# Patient Record
Sex: FEMALE | Race: WHITE | NOT HISPANIC OR LATINO | Employment: FULL TIME | URBAN - METROPOLITAN AREA
[De-identification: names, ages, dates, MRNs, and addresses within clinical notes are randomized per-mention and may not be internally consistent; named-entity substitution may affect disease eponyms.]

---

## 2018-08-01 ENCOUNTER — OFFICE VISIT (OUTPATIENT)
Dept: FAMILY MEDICINE CLINIC | Facility: CLINIC | Age: 53
End: 2018-08-01
Payer: COMMERCIAL

## 2018-08-01 VITALS
DIASTOLIC BLOOD PRESSURE: 70 MMHG | HEIGHT: 63 IN | RESPIRATION RATE: 16 BRPM | HEART RATE: 66 BPM | WEIGHT: 124 LBS | TEMPERATURE: 97.8 F | SYSTOLIC BLOOD PRESSURE: 122 MMHG | BODY MASS INDEX: 21.97 KG/M2

## 2018-08-01 DIAGNOSIS — R53.83 FATIGUE, UNSPECIFIED TYPE: Primary | ICD-10-CM

## 2018-08-01 PROCEDURE — 99204 OFFICE O/P NEW MOD 45 MIN: CPT | Performed by: FAMILY MEDICINE

## 2018-08-01 PROCEDURE — 3008F BODY MASS INDEX DOCD: CPT | Performed by: FAMILY MEDICINE

## 2018-08-01 NOTE — PROGRESS NOTES
FAMILY PRACTICE OFFICE VISIT       NAME: Alva Wen  AGE: 47 y o  SEX: female       : 1965        MRN: [id number]    DATE: 2018  TIME: 1:16 PM    Assessment and Plan     Problem List Items Addressed This Visit     Fatigue - Primary    Relevant Orders    CBC    Comprehensive metabolic panel    TSH, 3rd generation    Lyme disease, western blot        No specific etiology determined for patient's fatigue  We will check blood work as ordered for further evaluation  We will hold on sleep study as patient's symptoms are not significant for sleep apnea  She was also given referral to gastroenterologist for screening colonoscopy  There are no Patient Instructions on file for this visit  Chief Complaint     Chief Complaint   Patient presents with    Fatigue     Patient has been very fatigue x 4-5 months  History of Present Illness     Patient describes chronic fatigue for the past 4-5 months  She denies any changes with her work schedule a which is usually 40+ hours per week  She sleeps from 11:30 pm to 7 a m  unfortunately she awakens and shortly after 1 hour she feels very fatigued once again  She does not wake up often throughout the night  She has lost a 70 pounds over the past 2 years with changes in her diet and exercise regimen  Patient walks often throughout the week and strives to get 10,000 steps a day  She denies any changes with her bowel movements  She does seeing her gynecologist on a regular bases and has had mammograms  She has yet to obtain screening colonoscopy as recommended by her gynecologist   She does not take any daily medications  She is a nonsmoker  She does snore at night but not report any gasping for air at night nor any witnessed apneic episodes        Review of Systems   Review of Systems   Constitutional: Positive for fatigue  Negative for fever  HENT: Negative  Respiratory: Negative  Cardiovascular: Negative  Gastrointestinal: Negative  Genitourinary: Negative  Musculoskeletal: Negative  Skin: Negative  Psychiatric/Behavioral: Negative  Active Problem List     Patient Active Problem List   Diagnosis    Fatigue       Past Medical History:  No past medical history on file  Past Surgical History:  No past surgical history on file  Family History:  No family history on file  Social History:  Social History     Social History    Marital status: /Civil Union     Spouse name: N/A    Number of children: N/A    Years of education: N/A     Occupational History    Not on file  Social History Main Topics    Smoking status: Never Smoker    Smokeless tobacco: Never Used    Alcohol use Yes    Drug use: No    Sexual activity: Not on file     Other Topics Concern    Not on file     Social History Narrative    No narrative on file       Objective     Vitals:    08/01/18 1114   BP: 122/70   Pulse: 66   Resp: 16   Temp: 97 8 °F (36 6 °C)     Wt Readings from Last 3 Encounters:   08/01/18 56 2 kg (124 lb)   10/22/15 72 6 kg (160 lb)   06/07/13 66 7 kg (147 lb)       Physical Exam   Constitutional: She is oriented to person, place, and time  No distress  HENT:   Mouth/Throat: Oropharynx is clear and moist  No oropharyngeal exudate  Tympanic membranes within normal limits bilaterally   Neck:   No carotid bruits   Cardiovascular:   Regular rate and rhythm with no murmurs   Pulmonary/Chest:   Lungs are clear to auscultation without wheezes,rales, or rhonchi   Abdominal:   Abdomen is soft, nontender with positive bowel sounds  There is no rebound or guarding  No masses palpated   Musculoskeletal: She exhibits no edema  Lymphadenopathy:     She has no cervical adenopathy  Neurological: She is alert and oriented to person, place, and time  No cranial nerve deficit  Skin: No rash noted  Psychiatric: She has a normal mood and affect   Her behavior is normal  Judgment and thought content normal        Pertinent Laboratory/Diagnostic Studies:  No results found for: GLUCOSE, BUN, CREATININE, CALCIUM, NA, K, CO2, CL  No results found for: ALT, AST, GGT, ALKPHOS, BILITOT    No results found for: WBC, HGB, HCT, MCV, PLT    No results found for: TSH    No results found for: CHOL  No results found for: TRIG  No results found for: HDL  No results found for: LDLCALC  No results found for: HGBA1C    No results found for this or any previous visit  Orders Placed This Encounter   Procedures    CBC    Comprehensive metabolic panel    TSH, 3rd generation    Lyme disease, western blot       ALLERGIES:  No Known Allergies    Current Medications     No current outpatient prescriptions on file  No current facility-administered medications for this visit  Health Maintenance   There are no preventive care reminders to display for this patient  There is no immunization history on file for this patient      Alexi Tapia MD

## 2018-08-02 ENCOUNTER — APPOINTMENT (OUTPATIENT)
Dept: LAB | Facility: CLINIC | Age: 53
End: 2018-08-02
Payer: COMMERCIAL

## 2018-08-02 DIAGNOSIS — R53.83 FATIGUE, UNSPECIFIED TYPE: ICD-10-CM

## 2018-08-02 LAB
ALBUMIN SERPL BCP-MCNC: 3.6 G/DL (ref 3.5–5)
ALP SERPL-CCNC: 61 U/L (ref 46–116)
ALT SERPL W P-5'-P-CCNC: 20 U/L (ref 12–78)
ANION GAP SERPL CALCULATED.3IONS-SCNC: 6 MMOL/L (ref 4–13)
AST SERPL W P-5'-P-CCNC: 19 U/L (ref 5–45)
BILIRUB SERPL-MCNC: 0.54 MG/DL (ref 0.2–1)
BUN SERPL-MCNC: 16 MG/DL (ref 5–25)
CALCIUM SERPL-MCNC: 8.9 MG/DL (ref 8.3–10.1)
CHLORIDE SERPL-SCNC: 104 MMOL/L (ref 100–108)
CO2 SERPL-SCNC: 31 MMOL/L (ref 21–32)
CREAT SERPL-MCNC: 0.71 MG/DL (ref 0.6–1.3)
ERYTHROCYTE [DISTWIDTH] IN BLOOD BY AUTOMATED COUNT: 12.6 % (ref 11.6–15.1)
GFR SERPL CREATININE-BSD FRML MDRD: 98 ML/MIN/1.73SQ M
GLUCOSE P FAST SERPL-MCNC: 85 MG/DL (ref 65–99)
HCT VFR BLD AUTO: 39.6 % (ref 34.8–46.1)
HGB BLD-MCNC: 13.3 G/DL (ref 11.5–15.4)
MCH RBC QN AUTO: 30.8 PG (ref 26.8–34.3)
MCHC RBC AUTO-ENTMCNC: 33.6 G/DL (ref 31.4–37.4)
MCV RBC AUTO: 92 FL (ref 82–98)
PLATELET # BLD AUTO: 227 THOUSANDS/UL (ref 149–390)
PMV BLD AUTO: 9.8 FL (ref 8.9–12.7)
POTASSIUM SERPL-SCNC: 4.2 MMOL/L (ref 3.5–5.3)
PROT SERPL-MCNC: 7.1 G/DL (ref 6.4–8.2)
RBC # BLD AUTO: 4.32 MILLION/UL (ref 3.81–5.12)
SODIUM SERPL-SCNC: 141 MMOL/L (ref 136–145)
TSH SERPL DL<=0.05 MIU/L-ACNC: 3.89 UIU/ML (ref 0.36–3.74)
WBC # BLD AUTO: 3.24 THOUSAND/UL (ref 4.31–10.16)

## 2018-08-02 PROCEDURE — 80053 COMPREHEN METABOLIC PANEL: CPT

## 2018-08-02 PROCEDURE — 85027 COMPLETE CBC AUTOMATED: CPT

## 2018-08-02 PROCEDURE — 84443 ASSAY THYROID STIM HORMONE: CPT

## 2018-08-02 PROCEDURE — 86617 LYME DISEASE ANTIBODY: CPT

## 2018-08-02 PROCEDURE — 36415 COLL VENOUS BLD VENIPUNCTURE: CPT

## 2018-08-03 ENCOUNTER — TELEPHONE (OUTPATIENT)
Dept: FAMILY MEDICINE CLINIC | Facility: CLINIC | Age: 53
End: 2018-08-03

## 2018-08-03 DIAGNOSIS — E03.9 HYPOTHYROIDISM, UNSPECIFIED TYPE: Primary | ICD-10-CM

## 2018-08-03 LAB
B BURGDOR IGG PATRN SER IB-IMP: NEGATIVE
B BURGDOR IGM PATRN SER IB-IMP: NEGATIVE
B BURGDOR18KD IGG SER QL IB: NORMAL
B BURGDOR23KD IGG SER QL IB: NORMAL
B BURGDOR23KD IGM SER QL IB: NORMAL
B BURGDOR28KD IGG SER QL IB: NORMAL
B BURGDOR30KD IGG SER QL IB: NORMAL
B BURGDOR39KD IGG SER QL IB: NORMAL
B BURGDOR39KD IGM SER QL IB: NORMAL
B BURGDOR41KD IGG SER QL IB: NORMAL
B BURGDOR41KD IGM SER QL IB: NORMAL
B BURGDOR45KD IGG SER QL IB: NORMAL
B BURGDOR58KD IGG SER QL IB: NORMAL
B BURGDOR66KD IGG SER QL IB: NORMAL
B BURGDOR93KD IGG SER QL IB: NORMAL

## 2018-08-03 RX ORDER — LEVOTHYROXINE SODIUM 0.03 MG/1
25 TABLET ORAL DAILY
Qty: 30 TABLET | Refills: 5 | Status: SHIPPED | OUTPATIENT
Start: 2018-08-03

## 2018-08-03 NOTE — TELEPHONE ENCOUNTER
----- Message from Trish Mcginnis MD sent at 3/8/3398  2:43 PM EDT -----  Patient blood work did show borderline low thyroid level  This may cause some of her fatigue  I recommend starting levothyroxine 25 mcg once daily and recheck blood tests in 3 months    I will print out prescription and send medication to pharmacy

## 2018-08-06 ENCOUNTER — TELEPHONE (OUTPATIENT)
Dept: FAMILY MEDICINE CLINIC | Facility: CLINIC | Age: 53
End: 2018-08-06

## 2018-08-06 DIAGNOSIS — E03.9 HYPOTHYROIDISM, UNSPECIFIED TYPE: Primary | ICD-10-CM

## 2018-08-06 DIAGNOSIS — D72.819 LEUKOPENIA, UNSPECIFIED TYPE: ICD-10-CM

## 2018-08-06 NOTE — TELEPHONE ENCOUNTER
----- Message from [address]  [address] sent at 8/3/2018  6:21 PM EDT -----  Regarding: Test Results Question  Contact: [phone]  I have a question about Comprehensive Metabolic Panel resulted on 8/2/18, 5:44 PM       Can you tell me which test reflected the elevated thyroid levels? I'm not sure which one tests for that

## 2018-08-06 NOTE — TELEPHONE ENCOUNTER
----- Message from [address] Wen sent at 8/4/2018  7:32 AM EDT -----  Regarding: Test Results Question  Contact: [phone]  You can disregard my previous question  The TSH result wasn't in my chart yet  New question though, should we be concerned about the low WBC reading? Thank you

## 2018-08-06 NOTE — TELEPHONE ENCOUNTER
Her wbc below normal may be related to her hypothyroid issue but usually not anything dangerous  I would just recheck when she rechecks her thyroid bw  I will print out new script

## 2019-10-17 DIAGNOSIS — Z12.39 SCREENING FOR MALIGNANT NEOPLASM OF BREAST: Primary | ICD-10-CM

## 2021-04-08 DIAGNOSIS — Z23 ENCOUNTER FOR IMMUNIZATION: ICD-10-CM

## 2023-01-31 ENCOUNTER — OFFICE VISIT (OUTPATIENT)
Dept: FAMILY MEDICINE CLINIC | Facility: CLINIC | Age: 58
End: 2023-01-31

## 2023-01-31 VITALS
HEART RATE: 75 BPM | RESPIRATION RATE: 18 BRPM | TEMPERATURE: 98 F | SYSTOLIC BLOOD PRESSURE: 120 MMHG | OXYGEN SATURATION: 97 % | HEIGHT: 61 IN | BODY MASS INDEX: 33.42 KG/M2 | WEIGHT: 177 LBS | DIASTOLIC BLOOD PRESSURE: 80 MMHG

## 2023-01-31 DIAGNOSIS — Z01.818 PREOPERATIVE EVALUATION OF A MEDICAL CONDITION TO RULE OUT SURGICAL CONTRAINDICATIONS (TAR REQUIRED): Primary | ICD-10-CM

## 2023-01-31 RX ORDER — LIDOCAINE AND PRILOCAINE 25; 25 MG/G; MG/G
CREAM TOPICAL
COMMUNITY
Start: 2023-01-17

## 2023-01-31 NOTE — ASSESSMENT & PLAN NOTE
Preoperative consultation  Overall the patient appears to be in stable health  I reviewed her preadmission testing of blood test, EKG, and chest x-ray    She is medically cleared to have bilateral mastectomy surgery as described

## 2023-01-31 NOTE — PROGRESS NOTES
FAMILY PRACTICE OFFICE VISIT       NAME: Alva Wen  AGE: 65 y o  SEX: female       : 1965        MRN: [id number]    DATE: 2023  TIME: 9:01 AM    Assessment and Plan     Problem List Items Addressed This Visit        Other    Preoperative evaluation of a medical condition to rule out surgical contraindications (TAR required) - Primary     Preoperative consultation  Overall the patient appears to be in stable health  I reviewed her preadmission testing of blood test, EKG, and chest x-ray  She is medically cleared to have bilateral mastectomy surgery as described                Chief Complaint     Chief Complaint   Patient presents with   • Pre-op Exam     B/L Masectomy with dr Emiliano Jimenez  on 2023       History of Present Illness     Patient in the office for preoperative consultation  She is scheduled to have bilateral mastectomy surgery performed on 2023  Procedure will be performed at [facility] by Hildegarde Essex  Patient denies any recent illness  She is up-to-date with COVID vaccination  She does not take any medications on a regular basis  Patient was noted to have abnormal screening mammogram which required breast biopsy  Patient biopsy was positive for carcinoma  Extent of disease is unknown at this time and patient will have lymph node biopsy at time of mastectomy surgery to decide if she requires any further treatment  Patient had prior preadmission testing of blood work, EKG, and chest x-ray which I reviewed  Review of Systems   Review of Systems   Constitutional: Negative  HENT: Negative  Eyes: Negative  Respiratory: Negative  Cardiovascular: Negative  Gastrointestinal: Negative  Genitourinary: Negative  Musculoskeletal: Negative  Skin: Negative  Neurological: Negative  Psychiatric/Behavioral: Negative          Active Problem List     Patient Active Problem List   Diagnosis   • Fatigue   • Preoperative evaluation of a medical condition to rule out surgical contraindications (TAR required)       Past Medical History:  Past Medical History:   Diagnosis Date   • Medical history unknown        Past Surgical History:  Past Surgical History:   Procedure Laterality Date   •  SECTION      had c section        Family History:  Family History   Problem Relation Age of Onset   • Heart disease Maternal Grandfather    • Cancer Paternal Grandmother    • Cancer Paternal Grandfather        Social History:  Social History     Socioeconomic History   • Marital status: /Civil Union     Spouse name: Not on file   • Number of children: Not on file   • Years of education: Not on file   • Highest education level: Not on file   Occupational History   • Not on file   Tobacco Use   • Smoking status: Never   • Smokeless tobacco: Never   Vaping Use   • Vaping Use: Never used   Substance and Sexual Activity   • Alcohol use: Yes     Comment: Pt drinks alcohol- beer on occasion   • Drug use: No   • Sexual activity: Yes   Other Topics Concern   • Not on file   Social History Narrative    Exercise Regularly      Social Determinants of Health     Financial Resource Strain: Not on file   Food Insecurity: Not on file   Transportation Needs: Not on file   Physical Activity: Not on file   Stress: Not on file   Social Connections: Not on file   Intimate Partner Violence: Not on file   Housing Stability: Not on file       Objective     Vitals:    23 0824   BP: 120/80   Pulse: 75   Resp: 18   Temp: 98 °F (36 7 °C)   SpO2: 97%     Wt Readings from Last 3 Encounters:   23 80 3 kg (177 lb)   18 56 2 kg (124 lb)   10/22/15 72 6 kg (160 lb)       Physical Exam  Constitutional:       General: She is not in acute distress  Appearance: Normal appearance  She is not ill-appearing  HENT:      Head: Normocephalic and atraumatic  Eyes:      General:         Right eye: No discharge  Left eye: No discharge        Extraocular Movements: Extraocular movements intact  Conjunctiva/sclera: Conjunctivae normal       Pupils: Pupils are equal, round, and reactive to light  Neck:      Vascular: No carotid bruit  Cardiovascular:      Rate and Rhythm: Normal rate and regular rhythm  Heart sounds: Normal heart sounds  No murmur heard  Pulmonary:      Effort: Pulmonary effort is normal       Breath sounds: Normal breath sounds  No wheezing, rhonchi or rales  Abdominal:      General: Abdomen is flat  Bowel sounds are normal  There is no distension  Palpations: Abdomen is soft  Tenderness: There is no abdominal tenderness  There is no guarding or rebound  Musculoskeletal:      Right lower leg: No edema  Left lower leg: No edema  Lymphadenopathy:      Cervical: No cervical adenopathy  Skin:     Findings: No rash  Neurological:      General: No focal deficit present  Mental Status: She is alert and oriented to person, place, and time  Cranial Nerves: No cranial nerve deficit  Psychiatric:         Mood and Affect: Mood normal          Behavior: Behavior normal          Thought Content:  Thought content normal          Judgment: Judgment normal          Pertinent Laboratory/Diagnostic Studies:  Lab Results   Component Value Date    BUN 16 08/02/2018    CREATININE 0 71 08/02/2018    CALCIUM 8 9 08/02/2018    K 4 2 08/02/2018    CO2 31 08/02/2018     08/02/2018     Lab Results   Component Value Date    ALT 20 08/02/2018    AST 19 08/02/2018    ALKPHOS 61 08/02/2018       Lab Results   Component Value Date    WBC 3 24 (L) 08/02/2018    HGB 13 3 08/02/2018    HCT 39 6 08/02/2018    MCV 92 08/02/2018     08/02/2018       No results found for: TSH    No results found for: CHOL  No results found for: TRIG  No results found for: HDL  No results found for: LDLCALC  No results found for: HGBA1C    Results for orders placed or performed in visit on 08/02/18   CBC   Result Value Ref Range    WBC 3 24 (L) 4 31 - 10 16 Thousand/uL    RBC 4 32 3 81 - 5 12 Million/uL    Hemoglobin 13 3 11 5 - 15 4 g/dL    Hematocrit 39 6 34 8 - 46 1 %    MCV 92 82 - 98 fL    MCH 30 8 26 8 - 34 3 pg    MCHC 33 6 31 4 - 37 4 g/dL    RDW 12 6 11 6 - 15 1 %    Platelets 829 274 - 067 Thousands/uL    MPV 9 8 8 9 - 12 7 fL   Comprehensive metabolic panel   Result Value Ref Range    Sodium 141 136 - 145 mmol/L    Potassium 4 2 3 5 - 5 3 mmol/L    Chloride 104 100 - 108 mmol/L    CO2 31 21 - 32 mmol/L    ANION GAP 6 4 - 13 mmol/L    BUN 16 5 - 25 mg/dL    Creatinine 0 71 0 60 - 1 30 mg/dL    Glucose, Fasting 85 65 - 99 mg/dL    Calcium 8 9 8 3 - 10 1 mg/dL    AST 19 5 - 45 U/L    ALT 20 12 - 78 U/L    Alkaline Phosphatase 61 46 - 116 U/L    Total Protein 7 1 6 4 - 8 2 g/dL    Albumin 3 6 3 5 - 5 0 g/dL    Total Bilirubin 0 54 0 20 - 1 00 mg/dL    eGFR 98 ml/min/1 73sq m   TSH, 3rd generation   Result Value Ref Range    TSH 3RD GENERATON 3 890 (H) 0 358 - 3 740 uIU/mL   Lyme disease, western blot   Result Value Ref Range    Lyme 18 kD IgG Absent     Lyme 23 kD IgG Absent     Lyme 28 kD IgG Absent     Lyme 30 kD IgG Absent     Lyme 39 kD IgG Absent     Lyme 41 kD IgG Absent     Lyme 45 kD IgG Absent     Lyme 58 kD IgG Absent     Lyme 66 kD IgG Absent     Lyme 93 kD IgG Absent     Lyme 23 kD IgM Absent     Lyme 39 kD IgM Absent     Lyme 41 kD IgM Absent     Lyme IgG WB Interp  Negative     Lyme IgM WB Interp  Negative        No orders of the defined types were placed in this encounter  ALLERGIES:  No Known Allergies    Current Medications     Current Outpatient Medications   Medication Sig Dispense Refill   • lidocaine-prilocaine (EMLA) cream PLEASE SEE ATTACHED FOR DETAILED DIRECTIONS       No current facility-administered medications for this visit           Health Maintenance     Health Maintenance   Topic Date Due   • Hepatitis C Screening  Never done   • HIV Screening  Never done   • BMI: Followup Plan  Never done   • Annual Physical Never done   • DTaP,Tdap,and Td Vaccines (1 - Tdap) Never done   • Cervical Cancer Screening  Never done   • Breast Cancer Screening: Mammogram  Never done   • Colorectal Cancer Screening  Never done   • COVID-19 Vaccine (3 - Booster for Moderna series) 07/03/2021   • Influenza Vaccine (1) 06/30/2023 (Originally 9/1/2022)   • Depression Screening  01/31/2024   • BMI: Adult  01/31/2024   • Pneumococcal Vaccine: Pediatrics (0 to 5 Years) and At-Risk Patients (6 to 59 Years)  Aged Out   • HIB Vaccine  Aged Out   • IPV Vaccine  Aged Out   • Hepatitis A Vaccine  Aged Out   • Meningococcal ACWY Vaccine  Aged Out   • HPV Vaccine  Aged [employer] History   Administered Date(s) Administered   • COVID-19 MODERNA VACC 0 5 ML IM 04/10/2021, 71/99/9273       Lelo Cates MD

## 2023-06-01 ENCOUNTER — APPOINTMENT (RX ONLY)
Dept: URBAN - METROPOLITAN AREA CLINIC 333 | Facility: CLINIC | Age: 58
Setting detail: DERMATOLOGY
End: 2023-06-01

## 2023-06-01 DIAGNOSIS — L81.4 OTHER MELANIN HYPERPIGMENTATION: ICD-10-CM

## 2023-06-01 DIAGNOSIS — L40.0 PSORIASIS VULGARIS: ICD-10-CM | Status: INADEQUATELY CONTROLLED

## 2023-06-01 DIAGNOSIS — L57.8 OTHER SKIN CHANGES DUE TO CHRONIC EXPOSURE TO NONIONIZING RADIATION: ICD-10-CM

## 2023-06-01 DIAGNOSIS — L82.1 OTHER SEBORRHEIC KERATOSIS: ICD-10-CM

## 2023-06-01 DIAGNOSIS — D22 MELANOCYTIC NEVI: ICD-10-CM

## 2023-06-01 DIAGNOSIS — Z12.83 ENCOUNTER FOR SCREENING FOR MALIGNANT NEOPLASM OF SKIN: ICD-10-CM

## 2023-06-01 PROBLEM — D23.72 OTHER BENIGN NEOPLASM OF SKIN OF LEFT LOWER LIMB, INCLUDING HIP: Status: ACTIVE | Noted: 2023-06-01

## 2023-06-01 PROBLEM — D22.5 MELANOCYTIC NEVI OF TRUNK: Status: ACTIVE | Noted: 2023-06-01

## 2023-06-01 PROCEDURE — ? FULL BODY SKIN EXAM

## 2023-06-01 PROCEDURE — 99204 OFFICE O/P NEW MOD 45 MIN: CPT

## 2023-06-01 PROCEDURE — ? COUNSELING

## 2023-06-01 PROCEDURE — ? TREATMENT REGIMEN

## 2023-06-01 PROCEDURE — ? PRESCRIPTION MEDICATION MANAGEMENT

## 2023-06-01 PROCEDURE — ? PRESCRIPTION

## 2023-06-01 RX ORDER — CLOBETASOL PROPIONATE 0.5 MG/ML
1 SOLUTION TOPICAL QD
Qty: 50 | Refills: 3 | Status: ERX | COMMUNITY
Start: 2023-06-01

## 2023-06-01 RX ADMIN — CLOBETASOL PROPIONATE 1: 0.5 SOLUTION TOPICAL at 00:00

## 2023-06-01 ASSESSMENT — LOCATION DETAILED DESCRIPTION DERM
LOCATION DETAILED: RIGHT SUPERIOR MEDIAL UPPER BACK
LOCATION DETAILED: INFERIOR THORACIC SPINE
LOCATION DETAILED: LEFT SUPERIOR UPPER BACK
LOCATION DETAILED: RIGHT SUPERIOR UPPER BACK
LOCATION DETAILED: UPPER STERNUM
LOCATION DETAILED: RIGHT SUPERIOR PARIETAL SCALP

## 2023-06-01 ASSESSMENT — LOCATION ZONE DERM
LOCATION ZONE: SCALP
LOCATION ZONE: TRUNK

## 2023-06-01 ASSESSMENT — LOCATION SIMPLE DESCRIPTION DERM
LOCATION SIMPLE: SCALP
LOCATION SIMPLE: RIGHT UPPER BACK
LOCATION SIMPLE: CHEST
LOCATION SIMPLE: UPPER BACK
LOCATION SIMPLE: LEFT UPPER BACK

## 2025-05-28 ENCOUNTER — OFFICE VISIT (OUTPATIENT)
Dept: FAMILY MEDICINE CLINIC | Facility: CLINIC | Age: 60
End: 2025-05-28
Payer: COMMERCIAL

## 2025-05-28 ENCOUNTER — APPOINTMENT (OUTPATIENT)
Dept: LAB | Facility: CLINIC | Age: 60
End: 2025-05-28
Payer: COMMERCIAL

## 2025-05-28 VITALS
OXYGEN SATURATION: 98 % | SYSTOLIC BLOOD PRESSURE: 138 MMHG | HEART RATE: 75 BPM | BODY MASS INDEX: 35.08 KG/M2 | HEIGHT: 61 IN | WEIGHT: 185.8 LBS | RESPIRATION RATE: 16 BRPM | DIASTOLIC BLOOD PRESSURE: 88 MMHG | TEMPERATURE: 98.2 F

## 2025-05-28 DIAGNOSIS — C50.919: ICD-10-CM

## 2025-05-28 DIAGNOSIS — R53.83 FATIGUE, UNSPECIFIED TYPE: Primary | ICD-10-CM

## 2025-05-28 DIAGNOSIS — Z23 ENCOUNTER FOR IMMUNIZATION: ICD-10-CM

## 2025-05-28 DIAGNOSIS — R53.83 FATIGUE, UNSPECIFIED TYPE: ICD-10-CM

## 2025-05-28 DIAGNOSIS — Z00.00 ANNUAL PHYSICAL EXAM: ICD-10-CM

## 2025-05-28 LAB
25(OH)D3 SERPL-MCNC: 63 NG/ML (ref 30–100)
ALBUMIN SERPL BCG-MCNC: 4.2 G/DL (ref 3.5–5)
ALP SERPL-CCNC: 96 U/L (ref 34–104)
ALT SERPL W P-5'-P-CCNC: 28 U/L (ref 7–52)
ANION GAP SERPL CALCULATED.3IONS-SCNC: 6 MMOL/L (ref 4–13)
AST SERPL W P-5'-P-CCNC: 31 U/L (ref 13–39)
BILIRUB SERPL-MCNC: 0.58 MG/DL (ref 0.2–1)
BUN SERPL-MCNC: 12 MG/DL (ref 5–25)
CALCIUM SERPL-MCNC: 9.3 MG/DL (ref 8.4–10.2)
CHLORIDE SERPL-SCNC: 103 MMOL/L (ref 96–108)
CHOLEST SERPL-MCNC: 221 MG/DL (ref ?–200)
CO2 SERPL-SCNC: 32 MMOL/L (ref 21–32)
CREAT SERPL-MCNC: 0.72 MG/DL (ref 0.6–1.3)
ERYTHROCYTE [DISTWIDTH] IN BLOOD BY AUTOMATED COUNT: 12.9 % (ref 11.6–15.1)
EST. AVERAGE GLUCOSE BLD GHB EST-MCNC: 123 MG/DL
FERRITIN SERPL-MCNC: 87 NG/ML (ref 30–307)
GFR SERPL CREATININE-BSD FRML MDRD: 92 ML/MIN/1.73SQ M
GLUCOSE P FAST SERPL-MCNC: 99 MG/DL (ref 65–99)
HBA1C MFR BLD: 5.9 %
HCT VFR BLD AUTO: 39.7 % (ref 34.8–46.1)
HDLC SERPL-MCNC: 46 MG/DL
HGB BLD-MCNC: 13.4 G/DL (ref 11.5–15.4)
IRON SATN MFR SERPL: 26 % (ref 15–50)
IRON SERPL-MCNC: 97 UG/DL (ref 50–212)
LDLC SERPL CALC-MCNC: 139 MG/DL (ref 0–100)
MCH RBC QN AUTO: 30.5 PG (ref 26.8–34.3)
MCHC RBC AUTO-ENTMCNC: 33.8 G/DL (ref 31.4–37.4)
MCV RBC AUTO: 90 FL (ref 82–98)
NONHDLC SERPL-MCNC: 175 MG/DL
PLATELET # BLD AUTO: 248 THOUSANDS/UL (ref 149–390)
PMV BLD AUTO: 9.7 FL (ref 8.9–12.7)
POTASSIUM SERPL-SCNC: 4.4 MMOL/L (ref 3.5–5.3)
PROT SERPL-MCNC: 7.2 G/DL (ref 6.4–8.4)
RBC # BLD AUTO: 4.4 MILLION/UL (ref 3.81–5.12)
SODIUM SERPL-SCNC: 141 MMOL/L (ref 135–147)
TIBC SERPL-MCNC: 373.8 UG/DL (ref 250–450)
TRANSFERRIN SERPL-MCNC: 267 MG/DL (ref 203–362)
TRIGL SERPL-MCNC: 180 MG/DL (ref ?–150)
TSH SERPL DL<=0.05 MIU/L-ACNC: 1.43 UIU/ML (ref 0.45–4.5)
UIBC SERPL-MCNC: 277 UG/DL (ref 155–355)
VIT B12 SERPL-MCNC: 260 PG/ML (ref 180–914)
WBC # BLD AUTO: 4.56 THOUSAND/UL (ref 4.31–10.16)

## 2025-05-28 PROCEDURE — 80053 COMPREHEN METABOLIC PANEL: CPT

## 2025-05-28 PROCEDURE — 83550 IRON BINDING TEST: CPT

## 2025-05-28 PROCEDURE — 82607 VITAMIN B-12: CPT

## 2025-05-28 PROCEDURE — 82306 VITAMIN D 25 HYDROXY: CPT

## 2025-05-28 PROCEDURE — 99396 PREV VISIT EST AGE 40-64: CPT | Performed by: FAMILY MEDICINE

## 2025-05-28 PROCEDURE — 84443 ASSAY THYROID STIM HORMONE: CPT

## 2025-05-28 PROCEDURE — 87389 HIV-1 AG W/HIV-1&-2 AB AG IA: CPT

## 2025-05-28 PROCEDURE — 83036 HEMOGLOBIN GLYCOSYLATED A1C: CPT

## 2025-05-28 PROCEDURE — 83540 ASSAY OF IRON: CPT

## 2025-05-28 PROCEDURE — 80061 LIPID PANEL: CPT

## 2025-05-28 PROCEDURE — 85027 COMPLETE CBC AUTOMATED: CPT

## 2025-05-28 PROCEDURE — 86803 HEPATITIS C AB TEST: CPT

## 2025-05-28 PROCEDURE — 36415 COLL VENOUS BLD VENIPUNCTURE: CPT

## 2025-05-28 PROCEDURE — 82728 ASSAY OF FERRITIN: CPT

## 2025-05-28 RX ORDER — ANASTROZOLE 1 MG/1
1 TABLET ORAL DAILY
COMMUNITY
Start: 2025-03-22

## 2025-05-28 RX ORDER — KETOCONAZOLE 20 MG/ML
SHAMPOO, SUSPENSION TOPICAL
COMMUNITY
Start: 2025-04-18

## 2025-05-28 RX ORDER — ROFLUMILAST 3 MG/G
AEROSOL, FOAM TOPICAL
COMMUNITY
Start: 2025-04-16

## 2025-05-28 NOTE — ASSESSMENT & PLAN NOTE
Diagnosed November 2022, mastectomy 2023. Follows with oncology in [place], will request records.

## 2025-05-28 NOTE — ASSESSMENT & PLAN NOTE
Orders:  •  CBC and Platelet; Future  •  TSH, 3rd generation with Free T4 reflex; Future  •  Vitamin D 25 hydroxy; Future  •  Vitamin B12; Future  •  Iron Panel (Includes Ferritin, Iron Sat%, Iron, and TIBC); Future

## 2025-05-28 NOTE — PROGRESS NOTES
Adult Annual Physical  Name: Alva Wen      : 1965      MRN: [id number]  Encounter Provider: Michelle Matthew DO  Encounter Date: 2025   Encounter department: [facility]    :  Assessment & Plan  Fatigue, unspecified type    Orders:  •  CBC and Platelet; Future  •  TSH, 3rd generation with Free T4 reflex; Future  •  Vitamin D 25 hydroxy; Future  •  Vitamin B12; Future  •  Iron Panel (Includes Ferritin, Iron Sat%, Iron, and TIBC); Future    Annual physical exam    Orders:  •  CBC and Platelet; Future  •  Comprehensive metabolic panel; Future  •  Lipid panel; Future  •  Hemoglobin A1C; Future  •  HIV 1/2 AB/AG w Reflex [facility] for 2 yr old and above; Future  •  Hepatitis C antibody; Future    Malignant neoplasm of breast greater than or equal to 2 cm in greatest dimension, unspecified laterality (HCC)  Diagnosed 2022, mastectomy . Follows with oncology in [place], will request records.       Encounter for immunization             Preventive Screenings:    - Breast cancer screening: has history of breast cancer   - Colon cancer screening: screening up-to-date   - Lung cancer screening: screening not indicated     Immunizations:  - Immunizations due: Tdap and Zoster (Shingrix)         History of Present Illness       A lot of fatigue, unsure if menopause or medication related (Anastrazole). Works long hours.     Mammograms done at [place], 2024 was last mammo, normal results.    DXA with oncologist, last was this year.    Last pap also in [place] last year.       Adult Annual Physical:  Patient presents for annual physical.     Diet and Physical Activity:  - Diet/Nutrition: no special diet and frequent junk food.  - Exercise: no formal exercise. tries to walk but no energy    Depression Screening:  - PHQ-2 Score: 0    General Health:  - Sleep: 7-8 hours of sleep on average and sleeps well.  - Hearing: normal hearing bilateral ears.  - Vision: wears  "glasses.  - Dental: regular dental visits and brushes teeth twice daily.    /GYN Health:  - Follows with GYN: yes.   - Menopause: postmenopausal.   - History of STDs: no  - Contraception: tubal ligation.      Advanced Care Planning:  - Has an advanced directive?: yes    - Has a durable medical POA?: no      Review of Systems      Objective   /88 (BP Location: Right arm, Patient Position: Sitting, Cuff Size: Standard)   Pulse 75   Temp 98.2 °F (36.8 °C) (Temporal)   Resp 16   Ht 5' 1.42\" (1.56 m)   Wt 84.3 kg (185 lb 12.8 oz)   SpO2 98%   BMI 34.63 kg/m²     Physical Exam  Vitals reviewed.   Constitutional:       Appearance: Normal appearance.   HENT:      Right Ear: External ear normal.      Left Ear: External ear normal.      Nose: Nose normal.      Mouth/Throat:      Mouth: Mucous membranes are moist.      Pharynx: Oropharynx is clear.     Eyes:      Extraocular Movements: Extraocular movements intact.      Conjunctiva/sclera: Conjunctivae normal.       Cardiovascular:      Rate and Rhythm: Normal rate and regular rhythm.      Heart sounds: Normal heart sounds.   Pulmonary:      Effort: Pulmonary effort is normal.      Breath sounds: Normal breath sounds.   Abdominal:      General: Abdomen is flat.     Skin:     General: Skin is warm and dry.      Capillary Refill: Capillary refill takes less than 2 seconds.     Neurological:      General: No focal deficit present.      Mental Status: She is alert.     Psychiatric:         Mood and Affect: Mood normal.         Behavior: Behavior normal.         "

## 2025-05-29 LAB
HCV AB SER QL: NORMAL
HIV 1+2 AB+HIV1 P24 AG SERPL QL IA: NORMAL

## 2025-06-02 ENCOUNTER — RESULTS FOLLOW-UP (OUTPATIENT)
Dept: FAMILY MEDICINE CLINIC | Facility: CLINIC | Age: 60
End: 2025-06-02

## 2025-06-02 ENCOUNTER — APPOINTMENT (OUTPATIENT)
Dept: RADIOLOGY | Facility: AMBULARY SURGERY CENTER | Age: 60
End: 2025-06-02
Attending: ORTHOPAEDIC SURGERY
Payer: COMMERCIAL

## 2025-06-02 ENCOUNTER — OFFICE VISIT (OUTPATIENT)
Dept: OBGYN CLINIC | Facility: CLINIC | Age: 60
End: 2025-06-02
Payer: COMMERCIAL

## 2025-06-02 VITALS — BODY MASS INDEX: 34.04 KG/M2 | HEIGHT: 62 IN | WEIGHT: 185 LBS

## 2025-06-02 DIAGNOSIS — S86.111A GASTROCNEMIUS STRAIN, RIGHT, INITIAL ENCOUNTER: Primary | ICD-10-CM

## 2025-06-02 DIAGNOSIS — M25.561 RIGHT KNEE PAIN, UNSPECIFIED CHRONICITY: ICD-10-CM

## 2025-06-02 DIAGNOSIS — G89.29 CHRONIC PAIN OF RIGHT KNEE: ICD-10-CM

## 2025-06-02 DIAGNOSIS — M25.561 CHRONIC PAIN OF RIGHT KNEE: ICD-10-CM

## 2025-06-02 PROCEDURE — 99204 OFFICE O/P NEW MOD 45 MIN: CPT | Performed by: ORTHOPAEDIC SURGERY

## 2025-06-02 PROCEDURE — 73562 X-RAY EXAM OF KNEE 3: CPT

## 2025-06-02 NOTE — PROGRESS NOTES
Name: Alva Wen      : 1965      MRN: [id number]  Encounter Provider: Graeme Arriola DO  Encounter Date: 2025   Encounter department: [facility]  Assessment & Plan  Gastrocnemius strain, right, initial encounter  Chronic pain of right knee  Physical exam, diagnostic imaging, and subjective history are all most consistent with the above diagnosis. The pathoanatomy and natural history of this diagnosis was explained to the patient in detail.   X-ray obtained today and reviewed with the patient demonstrating mild osteoarthritis, no acute injury  Discussed that posterior leg numbness is likely secondary to inflammation compressing a nerve in the posterior knee   Order placed today for patient to initiate outpatient Physical Therapy  Continue Physical Therapy until discharged from their care, transition to home exercise program  Apply Voltaren gel to painful area up to 4 times a day  May use ice or heat as needed for pain relief  May take NSAIDs/Tylenol as needed for pain control  Follow up on an as needed basis      Orders:    XR knee 3 vw right non injury; Future    Ambulatory referral to Physical Therapy; Future          Subjective:  Alva Wen is a 59 y.o. female who presents today for evaluation and treatment of posterior and medial based knee pain that began at least one month ago while carrying a ladder with her . In the hours after this task, she noticed moderate pain aggravated by prolonged sitting, increased activity. Her pain is intermittent, aching, and she usually has accompanying numbness in the posterior knee that does not radiate distally. She denies previous knee pain or injury. She began taking Aleve yesterday with relief. She denies mechanical symptoms.       The following portions of the patient's history were reviewed and updated as appropriate: allergies, current medications, past family history, past social history, past surgical  history and problem list.      Social History     Socioeconomic History    Marital status: /Civil Union     Spouse name: Not on file    Number of children: Not on file    Years of education: Not on file    Highest education level: Not on file   Occupational History    Not on file   Tobacco Use    Smoking status: Never    Smokeless tobacco: Never   Vaping Use    Vaping status: Never Used   Substance and Sexual Activity    Alcohol use: Yes     Comment: Pt drinks alcohol- beer on occasion    Drug use: No    Sexual activity: Yes   Other Topics Concern    Not on file   Social History Narrative    Exercise Regularly      Social Drivers of Health     Financial Resource Strain: Not on file   Food Insecurity: No Food Insecurity (5/26/2025)    Nursing - Inadequate Food Risk Classification     Worried About Running Out of Food in the Last Year: Never true     Ran Out of Food in the Last Year: Never true     Ran Out of Food in the Last Year: Not on file   Transportation Needs: No Transportation Needs (5/26/2025)    PRAPARE - Transportation     Lack of Transportation (Medical): No     Lack of Transportation (Non-Medical): No   Physical Activity: Not on file   Stress: Not on file   Social Connections: Not on file   Intimate Partner Violence: Not on file   Housing Stability: Low Risk  (5/26/2025)    Housing Stability Vital Sign     Unable to Pay for Housing in the Last Year: No     Number of Times Moved in the Last Year: 0     Homeless in the Last Year: No     Past Medical History[1]  Past Surgical History[2]  Allergies[3]  Medications Ordered Prior to Encounter[4]         Objective:    Review of Systems  Pertinent items are noted in HPI.  All other systems were reviewed and are negative.    Physical Exam  Cons: Appears well.  No apparent distress.  Psych: Alert. Oriented x3.  Mood and affect normal.  Eyes: PERRLA, EOMI  Resp: Normal effort.  No audible wheezing or stridor.  CV: Palpable pulse.  No discernable arrhythmia.   "No LE edema.  Lymph:  No palpable cervical, axillary, or inguinal lymphadenopathy.  Skin: Warm.  No palpable masses.  No visible lesions.  Neuro: Normal muscle tone.  Normal and symmetric DTR's.    BMI:   Estimated body mass index is 34.39 kg/m² as calculated from the following:    Height as of this encounter: 5' 1.5\" (1.562 m).    Weight as of this encounter: 83.9 kg (185 lb).  Lab Results   Component Value Date    HGBA1C 5.9 (H) 05/28/2025         Right Knee Exam     Muscle Strength   The patient has normal right knee strength.    Range of Motion   Extension:  0   Flexion:  120     Tests   Dean:  Medial - negative Lateral - negative  Varus: negative Valgus: negative    Other   Erythema: absent  Scars: absent  Sensation: normal  Pulse: present  Swelling: none  Effusion: no effusion present    Comments:    Pain with knee extension + ankle dorsiflexion  TTP medial head of gastroc, lateral head of gastroc vs plantaris, medial joint line  - Dean, - Thessaly  Sensation intact in DP/SP/Villegas/Sa/T nerve distributions  2+ DP & PT pulses  Brisk capillary refill in all toes                Procedures  Procedures  -None performed today    Diagnostics, reviewed and taken today if performed as documented:  I have personally reviewed pertinent films in PACS and my interpretation is x-ray of the right knee obtained today reviewed demonstrating: no acute osseous abnormalities, mild medial compartment osteoarthritis .        Scribe Attestation      I,:  Josseline Kumar am acting as a scribe while in the presence of the attending physician.:       I,:  Graeme Arriola, DO personally performed the services described in this documentation    as scribed in my presence.:             Portions of the record may have been created with voice recognition software.  Occasional wrong word or \"sound a like\" substitutions may have occurred due to the inherent limitations of voice recognition software.  Read the chart carefully and recognize, " using context, where substitutions have occurred.         [1]   Past Medical History:  Diagnosis Date    Medical history unknown    [2]   Past Surgical History:  Procedure Laterality Date     SECTION      had c section    [3] No Known Allergies  [4]   Current Outpatient Medications on File Prior to Visit   Medication Sig Dispense Refill    anastrozole (ARIMIDEX) 1 mg tablet Take 1 tablet by mouth in the morning      ANASTROZOLE PO       ketoconazole (NIZORAL) 2 % shampoo APPLY TO SCALP EVERY SHOWER. LATHER, LEAVE ON FIVE MINUTES, THEN RINSE OUT.      Zoryve 0.3 % FOAM        No current facility-administered medications on file prior to visit.

## 2025-06-02 NOTE — ASSESSMENT & PLAN NOTE
Physical exam, diagnostic imaging, and subjective history are all most consistent with the above diagnosis. The pathoanatomy and natural history of this diagnosis was explained to the patient in detail.   X-ray obtained today and reviewed with the patient demonstrating mild osteoarthritis, no acute injury  Discussed that posterior leg numbness is likely secondary to inflammation compressing a nerve in the posterior knee   Order placed today for patient to initiate outpatient Physical Therapy  Continue Physical Therapy until discharged from their care, transition to home exercise program  Apply Voltaren gel to painful area up to 4 times a day  May use ice or heat as needed for pain relief  May take NSAIDs/Tylenol as needed for pain control  Follow up on an as needed basis      Orders:    XR knee 3 vw right non injury; Future    Ambulatory referral to Physical Therapy; Future

## 2025-07-14 ENCOUNTER — OFFICE VISIT (OUTPATIENT)
Dept: OBGYN CLINIC | Facility: CLINIC | Age: 60
End: 2025-07-14
Payer: COMMERCIAL

## 2025-07-14 VITALS — HEIGHT: 62 IN | BODY MASS INDEX: 34.04 KG/M2 | WEIGHT: 185 LBS

## 2025-07-14 DIAGNOSIS — S76.311D HAMSTRING STRAIN, RIGHT, SUBSEQUENT ENCOUNTER: ICD-10-CM

## 2025-07-14 DIAGNOSIS — M71.21 BAKER'S CYST OF KNEE, RIGHT: ICD-10-CM

## 2025-07-14 DIAGNOSIS — S86.111A GASTROCNEMIUS STRAIN, RIGHT, INITIAL ENCOUNTER: Primary | ICD-10-CM

## 2025-07-14 DIAGNOSIS — R29.818 NEUROGENIC CLAUDICATION: ICD-10-CM

## 2025-07-14 PROCEDURE — 99213 OFFICE O/P EST LOW 20 MIN: CPT | Performed by: ORTHOPAEDIC SURGERY

## 2025-07-14 PROCEDURE — 20610 DRAIN/INJ JOINT/BURSA W/O US: CPT | Performed by: ORTHOPAEDIC SURGERY

## 2025-07-14 RX ORDER — BUPIVACAINE HYDROCHLORIDE 2.5 MG/ML
2 INJECTION, SOLUTION INFILTRATION; PERINEURAL
Status: COMPLETED | OUTPATIENT
Start: 2025-07-14 | End: 2025-07-14

## 2025-07-14 RX ORDER — TRIAMCINOLONE ACETONIDE 40 MG/ML
80 INJECTION, SUSPENSION INTRA-ARTICULAR; INTRAMUSCULAR
Status: COMPLETED | OUTPATIENT
Start: 2025-07-14 | End: 2025-07-14

## 2025-07-14 RX ADMIN — TRIAMCINOLONE ACETONIDE 80 MG: 40 INJECTION, SUSPENSION INTRA-ARTICULAR; INTRAMUSCULAR at 09:15

## 2025-07-14 RX ADMIN — BUPIVACAINE HYDROCHLORIDE 2 ML: 2.5 INJECTION, SOLUTION INFILTRATION; PERINEURAL at 09:15

## 2025-07-14 NOTE — PROGRESS NOTES
Name: Alva Wen      : 1965      MRN: [id number]  Encounter Provider: Graeme Arriola DO  Encounter Date: 2025   Encounter department: [facility]  Assessment & Plan  Gastrocnemius strain, right, initial encounter  Hamstring strain, right, subsequent encounter  Baker's cyst of knee, right  Neurogenic claudication  Diagnostics reviewed and physical exam performed.  Diagnosis, treatment options and associated risks were discussed with the patient including no treatment, nonsurgical treatment and potential for surgical intervention.  The patient was given the opportunity to ask questions regarding each.   Discussed that her physical exam and subjective history today are more consistent with a neurogenic pathology rather than a structural knee injury, however physical exam also demonstrates injury to hamstring and calf tendons at the posterior knee.   Patient was offered, accepted, and received a cortisone injection of the right knee for both diagnostic and therapeutic purposes. Risks and benefits of CSI were discussed with the patient. The corticosteroid injection was administered without any immediate complication and was well tolerated by the patient. This was done under sterile technique. Post injection instructions and expectations were discussed.  Continue Physical Therapy until discharged from their care, transition to home exercise program  Order placed today for MRI of the right knee to further evaluate for structural injury, Baker's cyst, meniscal tear, hematoma patient has performed home exercises provided by her son who is an .  Apply Voltaren gel to painful area up to 4 times a day  May use ice or heat as needed for pain relief  May take NSAIDs/Tylenol as needed for pain control  Follow up after MRI         Subjective:  Alva Wen is a 59 y.o. female who presents today for follow up evaluation and treatment of posterior and medial based  knee pain that began at least two months ago while carrying a ladder with her . Since her last visit, she notes minimal improvement in her pain with use of Aleve, Voltaren gel three times daily, and home exercise program provided by her  son. Pain is rated 8/10 at worst. Her pain is becoming more constant, aching or throbbing, and she usually has accompanying numbness in the posterior knee that does not radiate distally. Pain at times shoots down the calf. Pain is aggravated by walking, prolonged standing, working outside. Pain would then resolve right away if she sits down. She denies previous knee pain or injury. She denies mechanical symptoms.     The following portions of the patient's history were reviewed and updated as appropriate: allergies, current medications, past family history, past social history, past surgical history and problem list.      Social History     Socioeconomic History    Marital status: /Civil Union     Spouse name: Not on file    Number of children: Not on file    Years of education: Not on file    Highest education level: Not on file   Occupational History    Not on file   Tobacco Use    Smoking status: Never    Smokeless tobacco: Never   Vaping Use    Vaping status: Never Used   Substance and Sexual Activity    Alcohol use: Yes     Comment: Pt drinks alcohol- beer on occasion    Drug use: No    Sexual activity: Yes   Other Topics Concern    Not on file   Social History Narrative    Exercise Regularly      Social Drivers of Health     Financial Resource Strain: Not on file   Food Insecurity: No Food Insecurity (5/26/2025)    Nursing - Inadequate Food Risk Classification     Worried About Running Out of Food in the Last Year: Never true     Ran Out of Food in the Last Year: Never true     Ran Out of Food in the Last Year: Not on file   Transportation Needs: No Transportation Needs (5/26/2025)    PRAPARE - Transportation     Lack of Transportation (Medical):  "No     Lack of Transportation (Non-Medical): No   Physical Activity: Not on file   Stress: Not on file   Social Connections: Not on file   Intimate Partner Violence: Not on file   Housing Stability: Low Risk  (5/26/2025)    Housing Stability Vital Sign     Unable to Pay for Housing in the Last Year: No     Number of Times Moved in the Last Year: 0     Homeless in the Last Year: No     Past Medical History[1]  Past Surgical History[2]  Allergies[3]  Medications Ordered Prior to Encounter[4]         Objective:    Review of Systems  Pertinent items are noted in HPI.  All other systems were reviewed and are negative.    Physical Exam  Cons: Appears well.  No apparent distress.  Psych: Alert. Oriented x3.  Mood and affect normal.  Eyes: PERRLA, EOMI  Resp: Normal effort.  No audible wheezing or stridor.  CV: Palpable pulse.  No discernable arrhythmia.  No LE edema.  Lymph:  No palpable cervical, axillary, or inguinal lymphadenopathy.  Skin: Warm.  No palpable masses.  No visible lesions.  Neuro: Normal muscle tone.  Normal and symmetric DTR's.    BMI:   Estimated body mass index is 34.39 kg/m² as calculated from the following:    Height as of this encounter: 5' 1.5\" (1.562 m).    Weight as of this encounter: 83.9 kg (185 lb).  Lab Results   Component Value Date    HGBA1C 5.9 (H) 05/28/2025         Right Knee Exam     Muscle Strength   The patient has normal right knee strength.    Range of Motion   Extension:  0   Flexion:  120     Tests   Dean:  Medial - negative Lateral - negative  Varus: negative Valgus: negative    Other   Erythema: absent  Scars: absent  Sensation: normal  Pulse: present  Swelling: none  Effusion: no effusion present    Comments:    Pain with knee extension + ankle dorsiflexion  TTP medial head of gastroc, lateral head of gastroc vs plantaris, medial joint line  - Dean, - Thessaly  + Slump test  Sensation intact in DP/SP/Villegas/Sa/T nerve distributions  2+ DP & PT pulses  Brisk capillary refill " "in all toes                Procedures  Large joint arthrocentesis: R knee    Performed by: Graeme Arriola DO  Authorized by: Graeme Arriola DO    Universal Protocol:  Consent: Verbal consent obtained  Risks and benefits: risks, benefits and alternatives were discussed  Consent given by: patient  Time out: Immediately prior to procedure a \"time out\" was called to verify the correct patient, procedure, equipment, support staff and site/side marked as required.  Patient understanding: patient states understanding of the procedure being performed  Site marked: the operative site was marked  Patient identity confirmed: verbally with patient  Supporting Documentation  Indications: pain and diagnostic evaluation     Is this a Visco injection? NoProcedure Details  Location: knee - R knee  Preparation: Patient was prepped and draped in the usual sterile fashion  Needle size: 22 G  Ultrasound guidance: no  Medications administered: 2 mL bupivacaine 0.25 %; 80 mg triamcinolone acetonide 40 mg/mL    Patient tolerance: patient tolerated the procedure well with no immediate complications  Dressing:  Sterile dressing applied          Diagnostics, reviewed and taken today if performed as documented:  I have personally reviewed pertinent films in PACS and my interpretation is x-ray of the right knee obtained today reviewed demonstrating: no acute osseous abnormalities, mild medial compartment osteoarthritis .        Scribe Attestation      I,:  Josseline Kumar am acting as a scribe while in the presence of the attending physician.:       I,:  Graeme Arriola DO personally performed the services described in this documentation    as scribed in my presence.:             Portions of the record may have been created with voice recognition software.  Occasional wrong word or \"sound a like\" substitutions may have occurred due to the inherent limitations of voice recognition software.  Read the chart carefully and recognize, using context, where " substitutions have occurred.         [1]   Past Medical History:  Diagnosis Date    Medical history unknown    [2]   Past Surgical History:  Procedure Laterality Date     SECTION      had c section    [3] No Known Allergies  [4]   Current Outpatient Medications on File Prior to Visit   Medication Sig Dispense Refill    anastrozole (ARIMIDEX) 1 mg tablet Take 1 tablet by mouth in the morning      ANASTROZOLE PO       ketoconazole (NIZORAL) 2 % shampoo APPLY TO SCALP EVERY SHOWER. LATHER, LEAVE ON FIVE MINUTES, THEN RINSE OUT.      Zoryve 0.3 % FOAM        No current facility-administered medications on file prior to visit.

## 2025-07-14 NOTE — ASSESSMENT & PLAN NOTE
Diagnostics reviewed and physical exam performed.  Diagnosis, treatment options and associated risks were discussed with the patient including no treatment, nonsurgical treatment and potential for surgical intervention.  The patient was given the opportunity to ask questions regarding each.   Discussed that her physical exam and subjective history today are more consistent with a neurogenic pathology rather than a structural knee injury, however physical exam also demonstrates injury to hamstring and calf tendons at the posterior knee.   Patient was offered, accepted, and received a cortisone injection of the right knee for both diagnostic and therapeutic purposes. Risks and benefits of CSI were discussed with the patient. The corticosteroid injection was administered without any immediate complication and was well tolerated by the patient. This was done under sterile technique. Post injection instructions and expectations were discussed.  Continue Physical Therapy until discharged from their care, transition to home exercise program  Order placed today for MRI of the right knee to further evaluate for structural injury, Baker's cyst, meniscal tear, hematoma patient has performed home exercises provided by her son who is an .  Apply Voltaren gel to painful area up to 4 times a day  May use ice or heat as needed for pain relief  May take NSAIDs/Tylenol as needed for pain control  Follow up after MRI

## 2025-07-23 ENCOUNTER — EVALUATION (OUTPATIENT)
Dept: PHYSICAL THERAPY | Facility: REHABILITATION | Age: 60
End: 2025-07-23
Attending: ORTHOPAEDIC SURGERY
Payer: COMMERCIAL

## 2025-07-23 DIAGNOSIS — G89.29 CHRONIC PAIN OF RIGHT KNEE: ICD-10-CM

## 2025-07-23 DIAGNOSIS — M25.561 CHRONIC PAIN OF RIGHT KNEE: ICD-10-CM

## 2025-07-23 DIAGNOSIS — S86.111A GASTROCNEMIUS STRAIN, RIGHT, INITIAL ENCOUNTER: Primary | ICD-10-CM

## 2025-07-23 PROCEDURE — 97161 PT EVAL LOW COMPLEX 20 MIN: CPT

## 2025-07-23 PROCEDURE — 97110 THERAPEUTIC EXERCISES: CPT

## 2025-07-23 NOTE — PROGRESS NOTES
PT Evaluation     Today's date: 2025  Patient name: Alva Wen  : 1965  MRN: [id number]  Referring provider: Graeme Arriola DO  Dx:   Encounter Diagnosis     ICD-10-CM    1. Gastrocnemius strain, right, initial encounter  S86.111A Ambulatory referral to Physical Therapy      2. Chronic pain of right knee  M25.561 Ambulatory referral to Physical Therapy    G89.29           Start Time: 1700  Stop Time: 1735  Total time in clinic (min): 35 minutes    Assessment  Impairments: abnormal or restricted ROM, activity intolerance, lacks appropriate home exercise program and pain with function  Symptom irritability: moderate    Assessment details:   Alva Wen is a pleasant 60 y.o. female who presents with chronic right knee pain. Symptoms today are most consistent with pes anserine bursitis vs tendonitis. Slight discomfort with knee extension with overpressure. No strength deficits. No loss on ROM. Provided and reviewed HEP today. The impairments listed above are resulting difficulties with ambulation, difficulties with heavy activities around the house, and reduced QoL. No further referral appears necessary at this time based upon examination results.  I expect she will improve over the next 6-8 weeks.    Understanding of Dx/Px/POC: good     Prognosis: good    Goals  Short Term Goals (Week 4):  1. Decreased pain by 50%  2. Demonstrate full knee PROM without discomfort  3. GROC >75%    Long Term Goals (8 weeks):  1. Return to all activities without limitations  2. Patient will exceed FOTO predicted outcome score  3. Patient will be fully independent with HEP by discharge  4. Patient will be able to manage symptoms independently.       Plan  Patient would benefit from: skilled physical therapy    Planned therapy interventions: graded exercise, functional ROM exercises, flexibility, graded activity, home exercise program, therapeutic training, therapeutic exercise, therapeutic activities, stretching,  strengthening, patient education, neuromuscular re-education, nerve gliding, behavior modification, balance, activity modification, manual therapy, IASTM and joint mobilization    Frequency: 1x week  Duration in weeks: 8  Plan of Care beginning date: 2025  Plan of Care expiration date: 2025  Treatment plan discussed with: patient        Subjective Evaluation    History of Present Illness  Mechanism of injury: Patient presents to PT with chronic right knee pain. Patient reports symptoms started around 2-3 months ago when she was helping to carry a ladder with her . Patient reports symptoms were localized to the lateral knee initially at onset. Patient reports she had an injection last Monday which has improved the symptoms. Patient reports symptoms are more in the  medial aspect of the knee. Patient describes the symptoms as a throbbing. Patient denies any locking/catching. Patient denies any buckling in the knee. Patient has no previous history of injury/trauma to the knee.   Patient Goals  Patient goals for therapy: decreased pain, increased motion, return to sport/leisure activities, independence with ADLs/IADLs and increased strength    Pain  Current pain ratin  At worst pain ratin  Quality: throbbing      Diagnostic Tests  X-ray: normal        Objective     Tenderness     Right Knee   Tenderness in the pes anserinus.     Neurological Testing     Sensation     Knee     Right Knee   Intact: light touch     Passive Range of Motion     Right Knee   Flexion: WFL  Extension: WFL and with pain    Additional Passive Range of Motion Details  Slight medial knee pain with ERP extension    Mobility   Patellar Mobility:     Right Knee   WFL: medial, lateral, superior and inferior    Strength/Myotome Testing     Right Knee   Flexion: 5  Extension: 5  Quadriceps contraction: good    Tests     Right Knee   Negative bounce home, lateral Dean and medial Dean.     Additional Tests Details  Negative  SLR and slump test             Precautions: Past Medical History[1]        Manuals 7/23                                                                Neuro Re-Ed 7/23                                                                                                       Ther Ex 7/23            Pt Edu, HEP, POC PRR                                                                                                       Ther Activity                                       Gait Training                                       Modalities                                                 [1]   Past Medical History:  Diagnosis Date    Medical history unknown

## 2025-08-06 ENCOUNTER — OFFICE VISIT (OUTPATIENT)
Dept: PHYSICAL THERAPY | Facility: REHABILITATION | Age: 60
End: 2025-08-06
Attending: ORTHOPAEDIC SURGERY
Payer: COMMERCIAL

## 2025-08-06 DIAGNOSIS — G89.29 CHRONIC PAIN OF RIGHT KNEE: ICD-10-CM

## 2025-08-06 DIAGNOSIS — M25.561 CHRONIC PAIN OF RIGHT KNEE: ICD-10-CM

## 2025-08-06 DIAGNOSIS — S86.111A GASTROCNEMIUS STRAIN, RIGHT, INITIAL ENCOUNTER: Primary | ICD-10-CM

## 2025-08-06 PROCEDURE — 97110 THERAPEUTIC EXERCISES: CPT

## 2025-08-06 PROCEDURE — 97140 MANUAL THERAPY 1/> REGIONS: CPT
